# Patient Record
Sex: MALE | Race: WHITE | NOT HISPANIC OR LATINO
[De-identification: names, ages, dates, MRNs, and addresses within clinical notes are randomized per-mention and may not be internally consistent; named-entity substitution may affect disease eponyms.]

---
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
## 2023-07-17 ENCOUNTER — APPOINTMENT (OUTPATIENT)
Dept: PEDIATRICS | Facility: CLINIC | Age: 18
End: 2023-07-17
Payer: COMMERCIAL

## 2023-07-17 VITALS — OXYGEN SATURATION: 98 % | WEIGHT: 200 LBS | TEMPERATURE: 98.1 F | HEART RATE: 85 BPM

## 2023-07-17 DIAGNOSIS — N50.82 SCROTAL PAIN: ICD-10-CM

## 2023-07-17 DIAGNOSIS — R10.9 UNSPECIFIED ABDOMINAL PAIN: ICD-10-CM

## 2023-07-17 DIAGNOSIS — R10.30 LOWER ABDOMINAL PAIN, UNSPECIFIED: ICD-10-CM

## 2023-07-17 DIAGNOSIS — R07.89 OTHER CHEST PAIN: ICD-10-CM

## 2023-07-17 DIAGNOSIS — M54.9 DORSALGIA, UNSPECIFIED: ICD-10-CM

## 2023-07-17 DIAGNOSIS — Z20.822 CONTACT WITH AND (SUSPECTED) EXPOSURE TO COVID-19: ICD-10-CM

## 2023-07-17 DIAGNOSIS — R39.89 OTHER SYMPTOMS AND SIGNS INVOLVING THE GENITOURINARY SYSTEM: ICD-10-CM

## 2023-07-17 LAB
BILIRUB UR QL STRIP: NEGATIVE
CLARITY UR: CLEAR
COLLECTION METHOD: NORMAL
GLUCOSE UR-MCNC: NEGATIVE
HCG UR QL: 0.2 EU/DL
HGB UR QL STRIP.AUTO: NEGATIVE
KETONES UR-MCNC: NEGATIVE
LEUKOCYTE ESTERASE UR QL STRIP: NEGATIVE
NITRITE UR QL STRIP: NEGATIVE
PH UR STRIP: 7
PROT UR STRIP-MCNC: NEGATIVE
SP GR UR STRIP: 1.02

## 2023-07-17 PROCEDURE — 81003 URINALYSIS AUTO W/O SCOPE: CPT | Mod: QW

## 2023-07-17 PROCEDURE — 99214 OFFICE O/P EST MOD 30 MIN: CPT

## 2023-07-17 NOTE — PHYSICAL EXAM
[Sam: ____] : Sam [unfilled] [Normal external genitalia] : normal external genitalia [Circumcised] : circumcised [Urethral Discharge] : no urethral discharge [Hydrocele] : no hydrocele [NL] : warm, clear [FreeTextEntry6] : No scrotal swelling or erythema.  [de-identified] : - CVA tenderness

## 2023-07-17 NOTE — HISTORY OF PRESENT ILLNESS
[de-identified] : back pain, tingling with urination [FreeTextEntry6] : 17 year old male with lower back pain for >6 months. Pain is described as aching and constant. Last night he experienced R groin cramping and scrotal pain. Has tingling with urination that has coincided with his back pain. Usually has a  daily bowel movement but is currently constipated and stooling only 3-4x/week. Of note he did have a few bowel movements yesterday, not watery. He was drinking a lot of soda but has been better and drinks mostly water. No fevers, abdominal pain, nausea, vomiting, urgency, blood in urine, penial discharge. Father has a history of kidney stones. \par \par Was seen in December 2022 for similar symptoms. UA and UC at the time negative. Renal US ordered but not done.\par \par

## 2023-07-17 NOTE — DISCUSSION/SUMMARY
[FreeTextEntry1] : 17 year old male with renal colic and urinary symptoms >6 months. Urine dip in office was negative for blood. Differentials include nephrolithiasis and UTI.  UA and UC sent out. Will obtain Renal US. Will followup results. Go to ED if pain acutely worsens or fever develops. \par

## 2023-07-19 LAB
APPEARANCE: CLEAR
BILIRUBIN URINE: NEGATIVE
BLOOD URINE: NEGATIVE
COLOR: YELLOW
GLUCOSE QUALITATIVE U: NEGATIVE MG/DL
KETONES URINE: NEGATIVE MG/DL
LEUKOCYTE ESTERASE URINE: NEGATIVE
NITRITE URINE: NEGATIVE
PH URINE: 7
PROTEIN URINE: NEGATIVE MG/DL
SPECIFIC GRAVITY URINE: 1.02
UROBILINOGEN URINE: 0.2 MG/DL

## 2023-07-24 ENCOUNTER — NON-APPOINTMENT (OUTPATIENT)
Age: 18
End: 2023-07-24

## 2023-07-26 ENCOUNTER — NON-APPOINTMENT (OUTPATIENT)
Age: 18
End: 2023-07-26

## 2023-08-03 ENCOUNTER — NON-APPOINTMENT (OUTPATIENT)
Age: 18
End: 2023-08-03